# Patient Record
Sex: FEMALE | Race: WHITE | Employment: OTHER | URBAN - METROPOLITAN AREA
[De-identification: names, ages, dates, MRNs, and addresses within clinical notes are randomized per-mention and may not be internally consistent; named-entity substitution may affect disease eponyms.]

---

## 2017-05-19 PROBLEM — E55.9 VITAMIN D DEFICIENCY: Status: ACTIVE | Noted: 2017-05-19

## 2018-02-14 ENCOUNTER — LAB ENCOUNTER (OUTPATIENT)
Dept: LAB | Facility: HOSPITAL | Age: 73
End: 2018-02-14
Attending: DERMATOLOGY

## 2018-02-14 DIAGNOSIS — L03.211 CELLULITIS OF FACE: ICD-10-CM

## 2018-02-14 PROCEDURE — 87205 SMEAR GRAM STAIN: CPT

## 2018-02-14 PROCEDURE — 87070 CULTURE OTHR SPECIMN AEROBIC: CPT

## 2018-02-19 NOTE — PROGRESS NOTES
Spoke with patient regarding culture results. Patient verbalized understanding and will f/u on Wednesday at her appt. Advised to call office with any questions or concerns.

## 2018-02-19 NOTE — PROGRESS NOTES
Tried reaching out to patient, per [employer] \"DO NOT LEAVE VOICEMAIL\". Will try calling patient later.

## 2019-04-12 PROBLEM — G89.29 CHRONIC LEFT-SIDED LOW BACK PAIN WITHOUT SCIATICA: Status: ACTIVE | Noted: 2019-04-12

## 2019-04-12 PROBLEM — L03.211 CELLULITIS OF FACE: Status: ACTIVE | Noted: 2019-04-12

## 2019-04-12 PROBLEM — M54.50 CHRONIC LEFT-SIDED LOW BACK PAIN WITHOUT SCIATICA: Status: ACTIVE | Noted: 2019-04-12
